# Patient Record
Sex: FEMALE | Race: WHITE | NOT HISPANIC OR LATINO
[De-identification: names, ages, dates, MRNs, and addresses within clinical notes are randomized per-mention and may not be internally consistent; named-entity substitution may affect disease eponyms.]

---

## 2017-01-25 PROBLEM — Z00.00 ENCOUNTER FOR PREVENTIVE HEALTH EXAMINATION: Status: ACTIVE | Noted: 2017-01-25

## 2017-04-18 ENCOUNTER — APPOINTMENT (OUTPATIENT)
Dept: SURGERY | Facility: CLINIC | Age: 82
End: 2017-04-18

## 2017-04-18 VITALS
HEART RATE: 74 BPM | HEIGHT: 60 IN | TEMPERATURE: 98 F | WEIGHT: 185 LBS | DIASTOLIC BLOOD PRESSURE: 70 MMHG | RESPIRATION RATE: 13 BRPM | SYSTOLIC BLOOD PRESSURE: 120 MMHG | OXYGEN SATURATION: 98 % | BODY MASS INDEX: 36.32 KG/M2

## 2017-04-18 DIAGNOSIS — Z72.3 LACK OF PHYSICAL EXERCISE: ICD-10-CM

## 2017-04-18 DIAGNOSIS — Z87.891 PERSONAL HISTORY OF NICOTINE DEPENDENCE: ICD-10-CM

## 2017-04-18 DIAGNOSIS — F15.90 OTHER STIMULANT USE, UNSPECIFIED, UNCOMPLICATED: ICD-10-CM

## 2017-04-18 DIAGNOSIS — Z91.89 OTHER SPECIFIED PERSONAL RISK FACTORS, NOT ELSEWHERE CLASSIFIED: ICD-10-CM

## 2017-04-18 RX ORDER — ROSUVASTATIN CALCIUM 5 MG/1
5 TABLET, FILM COATED ORAL
Refills: 0 | Status: ACTIVE | COMMUNITY

## 2017-09-12 ENCOUNTER — APPOINTMENT (OUTPATIENT)
Dept: SURGERY | Facility: CLINIC | Age: 82
End: 2017-09-12
Payer: MEDICARE

## 2017-09-12 DIAGNOSIS — Z82.49 FAMILY HISTORY OF ISCHEMIC HEART DISEASE AND OTHER DISEASES OF THE CIRCULATORY SYSTEM: ICD-10-CM

## 2017-09-12 DIAGNOSIS — Z83.3 FAMILY HISTORY OF DIABETES MELLITUS: ICD-10-CM

## 2017-09-12 PROCEDURE — 99214 OFFICE O/P EST MOD 30 MIN: CPT

## 2018-06-12 ENCOUNTER — APPOINTMENT (OUTPATIENT)
Dept: SURGERY | Facility: CLINIC | Age: 83
End: 2018-06-12
Payer: MEDICARE

## 2018-06-12 VITALS
SYSTOLIC BLOOD PRESSURE: 153 MMHG | DIASTOLIC BLOOD PRESSURE: 81 MMHG | WEIGHT: 180 LBS | TEMPERATURE: 98.6 F | OXYGEN SATURATION: 95 % | HEART RATE: 89 BPM | HEIGHT: 60 IN | BODY MASS INDEX: 35.34 KG/M2

## 2018-06-12 DIAGNOSIS — N60.19 DIFFUSE CYSTIC MASTOPATHY OF UNSPECIFIED BREAST: ICD-10-CM

## 2018-06-12 PROCEDURE — 99214 OFFICE O/P EST MOD 30 MIN: CPT

## 2018-10-16 ENCOUNTER — RX RENEWAL (OUTPATIENT)
Age: 83
End: 2018-10-16

## 2018-10-17 ENCOUNTER — RECORD ABSTRACTING (OUTPATIENT)
Age: 83
End: 2018-10-17

## 2018-10-17 DIAGNOSIS — E04.1 NONTOXIC SINGLE THYROID NODULE: ICD-10-CM

## 2018-10-17 DIAGNOSIS — Z83.49 FAMILY HISTORY OF OTHER ENDOCRINE, NUTRITIONAL AND METABOLIC DISEASES: ICD-10-CM

## 2018-10-19 ENCOUNTER — APPOINTMENT (OUTPATIENT)
Dept: ENDOCRINOLOGY | Facility: CLINIC | Age: 83
End: 2018-10-19
Payer: MEDICARE

## 2018-10-19 VITALS
BODY MASS INDEX: 35.34 KG/M2 | SYSTOLIC BLOOD PRESSURE: 140 MMHG | HEIGHT: 60 IN | WEIGHT: 180 LBS | DIASTOLIC BLOOD PRESSURE: 70 MMHG | HEART RATE: 86 BPM

## 2018-10-19 DIAGNOSIS — Z85.79 PERSONAL HISTORY OF OTHER MALIGNANT NEOPLASMS OF LYMPHOID, HEMATOPOIETIC AND RELATED TISSUES: ICD-10-CM

## 2018-10-19 LAB — GLUCOSE BLDC GLUCOMTR-MCNC: 153

## 2018-10-19 PROCEDURE — 82962 GLUCOSE BLOOD TEST: CPT

## 2018-10-19 PROCEDURE — 99214 OFFICE O/P EST MOD 30 MIN: CPT | Mod: 25

## 2019-04-05 ENCOUNTER — APPOINTMENT (OUTPATIENT)
Dept: ENDOCRINOLOGY | Facility: CLINIC | Age: 84
End: 2019-04-05
Payer: MEDICARE

## 2019-04-05 VITALS
HEART RATE: 84 BPM | SYSTOLIC BLOOD PRESSURE: 130 MMHG | BODY MASS INDEX: 35.14 KG/M2 | DIASTOLIC BLOOD PRESSURE: 70 MMHG | WEIGHT: 179 LBS | HEIGHT: 60 IN

## 2019-04-05 LAB
CHOLEST SERPL-MCNC: 159
GLUCOSE BLDC GLUCOMTR-MCNC: 170
HBA1C MFR BLD HPLC: 8.1
LDLC SERPL CALC-MCNC: 89
MICROALBUMIN/CREAT 24H UR-RTO: 14

## 2019-04-05 PROCEDURE — 82962 GLUCOSE BLOOD TEST: CPT

## 2019-04-05 PROCEDURE — 99214 OFFICE O/P EST MOD 30 MIN: CPT | Mod: 25

## 2019-04-05 NOTE — ASSESSMENT
[FreeTextEntry1] : 1. DM type 2, suboptimal but acceptable control\par 2. Hypothyroid, euthyroid on replacement\par 3. Hyperlipidemia, adequately controlled\par

## 2019-04-05 NOTE — PHYSICAL EXAM
[Thyroid Not Enlarged] : the thyroid was not enlarged [No Thyroid Nodules] : there were no palpable thyroid nodules [No Accessory Muscle Use] : no accessory muscle use [Clear to Auscultation] : lungs were clear to auscultation bilaterally [Normal S1, S2] : normal S1 and S2 [Regular Rhythm] : with a regular rhythm [de-identified] : left supraclavicular node

## 2019-04-05 NOTE — HISTORY OF PRESENT ILLNESS
[FreeTextEntry1] : Quality:  Type 2.   \par Severity:  Moderate\par Duration:  12 years\par Associated Symptoms:   \par Modifying Factors:   \par Notes:  Current regimen:\par 	glipizide/metformin 5/500 4/day (prefers TEVA generic brand)\par                 januvia 100\par \par transient rise in glucose following steroid injection to knee\par \par \par \par Blood Glucose Testing Information \par \par Patient is using the  meter and is testing 2 times per day.\par \par \par Past Medical History\par Notes:  lymphocytosis, small cell lymph a under observation. Has developed a left supraclavicular node and is scheduled for a PET scan\par drug rash; since then off crestor. Resumed at 5 mg 1/2 every other day without adverse effect so far.\par hypothyroid\par

## 2019-04-08 ENCOUNTER — MEDICATION RENEWAL (OUTPATIENT)
Age: 84
End: 2019-04-08

## 2019-05-17 ENCOUNTER — RX RENEWAL (OUTPATIENT)
Age: 84
End: 2019-05-17

## 2019-07-02 ENCOUNTER — APPOINTMENT (OUTPATIENT)
Dept: SURGERY | Facility: CLINIC | Age: 84
End: 2019-07-02

## 2019-07-02 ENCOUNTER — APPOINTMENT (OUTPATIENT)
Dept: BREAST CENTER | Facility: CLINIC | Age: 84
End: 2019-07-02
Payer: MEDICARE

## 2019-07-02 VITALS
SYSTOLIC BLOOD PRESSURE: 149 MMHG | WEIGHT: 178 LBS | BODY MASS INDEX: 34.95 KG/M2 | HEART RATE: 85 BPM | HEIGHT: 60 IN | DIASTOLIC BLOOD PRESSURE: 70 MMHG

## 2019-07-02 DIAGNOSIS — Z12.39 ENCOUNTER FOR OTHER SCREENING FOR MALIGNANT NEOPLASM OF BREAST: ICD-10-CM

## 2019-07-02 PROCEDURE — 99214 OFFICE O/P EST MOD 30 MIN: CPT

## 2019-07-02 NOTE — HISTORY OF PRESENT ILLNESS
[FreeTextEntry1] : I had the pleasure of seeing Mary Ford in the office for a clinical breast evaluation. Mary is a talia 80 yo postmenopausal female who is a known patient to me from [facility].\par \par She is in good general health and has fibrocystic disease of the breast. \par She denies dominant breast mass, skin changes or nipple discharge.\par \par She has a personal history with small lymphocytic lymphoma and is being managed by Dr. Gio Jimenez with surveillance (dX in 2017). She underwent a right breast excisional biopsy for a benign cyst more than 10 years ago.\par \par Imaging: [facility]\par 3/29/2019 Bilateral digital mammogram with 3D breast Tomosythesis\par Impression:  Patient has lymphadenopathy in both axilla which could be related to patients history of small lymphocytic lymphoma.  Recommend clinical correlation.  BIRADS 2 benign\par \par We have reviewed and discussed screening mammogram and results. Mary understands that she may continue annual screening mammogram as long as her health permits and she would be able to undergo necessary treatment if there was an abnormality found.  \par \par All questions were answered. \par

## 2019-07-02 NOTE — PHYSICAL EXAM
[Atraumatic] : atraumatic [Normocephalic] : normocephalic [Supple] : supple [PERRL] : pupils equal, round and reactive to light [Sclera nonicteric] : sclera nonicteric [Examined in the supine and seated position] : examined in the supine and seated position [No Supraclavicular Adenopathy] : no supraclavicular adenopathy [No dominant masses] : no dominant masses in right breast  [No dominant masses] : no dominant masses left breast [No Nipple Retraction] : no left nipple retraction [No Nipple Discharge] : no left nipple discharge [Breast Nipple Flattening] : nipples not flattened [Breast Nipple Inversion] : nipples not inverted [Breast Nipple Retraction] : nipples not retracted [Breast Abnormal Lactation (Galactorrhea)] : no galactorrhea [Breast Abnormal Secretion Serous Fluid] : no serous discharge [Breast Abnormal Secretion Bloody Fluid] : no bloody discharge [Breast Nipple Fissures] : nipples not fissured [No Axillary Lymphadenopathy] : no left axillary lymphadenopathy [Breast Abnormal Secretion Opalescent Fluid] : no milky discharge [No Edema] : no edema [No Ulceration] : no ulceration [No Rashes] : no rashes [de-identified] : No supraclavicular or axillary adenopathy. No dominant masses, normal to palpation.  Everted nipple without discharge. No skin changes.  [de-identified] : No supraclavicular or axillary adenopathy. No dominant masses, normal to palpation.  Everted nipple without discharge. No skin changes.

## 2019-07-02 NOTE — ASSESSMENT
[FreeTextEntry1] : 82 yo postmenopausal female with fibrocystic disease of the breast presents for clinical breast evaluation. There is no evidence of breast malignancy on clinical examination or review of recent imaging.\par 1. Clinical examination in 6 months to 1 year\par 2. Annual bilateral screening mammogram\par 3. Maintain vitamin d level at or above 40, walk 3x week for 25-40 minutes, decrease red meats in the diet. \par 4.  Will request consult note from Dr. Gio Jimenez\par \par

## 2019-07-11 ENCOUNTER — RX CHANGE (OUTPATIENT)
Age: 84
End: 2019-07-11

## 2019-09-16 ENCOUNTER — APPOINTMENT (OUTPATIENT)
Dept: ENDOCRINOLOGY | Facility: CLINIC | Age: 84
End: 2019-09-16
Payer: MEDICARE

## 2019-09-16 VITALS
WEIGHT: 176 LBS | SYSTOLIC BLOOD PRESSURE: 120 MMHG | HEART RATE: 73 BPM | BODY MASS INDEX: 34.55 KG/M2 | DIASTOLIC BLOOD PRESSURE: 60 MMHG | HEIGHT: 60 IN

## 2019-09-16 LAB
GLUCOSE BLDC GLUCOMTR-MCNC: 161
GLUCOSE SERPL-MCNC: 169
HBA1C MFR BLD HPLC: 7.1
LDLC SERPL DIRECT ASSAY-MCNC: 98

## 2019-09-16 PROCEDURE — 82962 GLUCOSE BLOOD TEST: CPT

## 2019-09-16 PROCEDURE — 99214 OFFICE O/P EST MOD 30 MIN: CPT | Mod: 25

## 2019-09-16 NOTE — HISTORY OF PRESENT ILLNESS
[FreeTextEntry1] : Quality:  Type 2.   \par Severity:  Moderate\par Duration:  12 years\par Associated Symptoms:   \par Modifying Factors:   \par Notes:  Current regimen:\par 	glipizide/metformin 5/500 4/day (prefers TEVA generic brand)\par                 januvia 100\par \par started on gabapentin, but developed rash and stopped this\par \par \par \par Blood Glucose Testing Information \par \par Patient is using the  meter and is testing 2 times per day.\par \par \par Past Medical History\par Notes:  lymphocytosis, small cell lymphoma under observation. Has developed a left supraclavicular node\par drug rash; since then off crestor. Resumed at 5 mg 1/2 every other day without adverse effect so far.\par hypothyroid\par

## 2019-09-16 NOTE — ASSESSMENT
[FreeTextEntry1] : 1. DM type 2, good control\par 2. Hypothyroid, euthyroid on replacement\par 3. Hyperlipidemia, adequately controlled\par 4. cellulitis vs. allergic reaction. Pt. will follow up with PCP\par

## 2019-09-16 NOTE — PHYSICAL EXAM
[Thyroid Not Enlarged] : the thyroid was not enlarged [Clear to Auscultation] : lungs were clear to auscultation bilaterally [Regular Rhythm] : with a regular rhythm [Supraclavicular Nodes] : supraclavicular nodes [de-identified] : erythematous rash LLE, with area of demarcation. Not warm

## 2020-02-10 RX ORDER — BLOOD-GLUCOSE METER
W/DEVICE KIT MISCELLANEOUS
Qty: 1 | Refills: 0 | Status: ACTIVE | COMMUNITY
Start: 2020-02-10 | End: 1900-01-01

## 2020-06-01 ENCOUNTER — RX RENEWAL (OUTPATIENT)
Age: 85
End: 2020-06-01

## 2020-08-05 ENCOUNTER — RX RENEWAL (OUTPATIENT)
Age: 85
End: 2020-08-05

## 2020-08-25 ENCOUNTER — APPOINTMENT (OUTPATIENT)
Dept: SURGERY | Facility: CLINIC | Age: 85
End: 2020-08-25

## 2020-11-25 ENCOUNTER — APPOINTMENT (OUTPATIENT)
Dept: ENDOCRINOLOGY | Facility: CLINIC | Age: 85
End: 2020-11-25
Payer: MEDICARE

## 2020-11-25 DIAGNOSIS — I10 ESSENTIAL (PRIMARY) HYPERTENSION: ICD-10-CM

## 2020-11-25 PROCEDURE — 99442: CPT

## 2020-11-25 NOTE — ASSESSMENT
[FreeTextEntry1] : 1. DM type 2, likely controlled\par 2. Hypothyroid, euthyroid on replacement\par 3. Hyperlipidemia, on therapy

## 2020-11-25 NOTE — HISTORY OF PRESENT ILLNESS
[Home] : at home, [unfilled] , at the time of the visit. [Other Location: e.g. Home (Enter Location, City,State)___] : at [unfilled] [Verbal consent obtained from patient] : the patient, [unfilled] [FreeTextEntry1] : Quality:  Type 2.   \par Severity:  Moderate\par Duration:  12 years\par Associated Symptoms:   \par Modifying Factors:   \par Notes:  Current regimen:\par 	glipizide/metformin 5/500 4/day (prefers TEVA generic brand)\par                 januvia 100\par \par started on gabapentin, but developed rash and stopped this\par \par \par \par Blood Glucose Testing Information \par \par Patient is using the  meter and is testing 2 times per day.\par \par \par Past Medical History\par Notes:  lymphocytosis, small cell lymphoma under observation. Has developed a left supraclavicular node\par drug rash; since then off crestor. Resumed at 5 mg 1/2 every other day without adverse effect so far.\par hypothyroid  on synthroid manuel\par

## 2020-12-22 ENCOUNTER — APPOINTMENT (OUTPATIENT)
Dept: ENDOCRINOLOGY | Facility: CLINIC | Age: 85
End: 2020-12-22
Payer: MEDICARE

## 2020-12-22 PROCEDURE — 99442: CPT

## 2020-12-22 NOTE — HISTORY OF PRESENT ILLNESS
[Home] : at home, [unfilled] , at the time of the visit. [Medical Office: ([facility])___] : at the medical office located in  [Verbal consent obtained from patient] : the patient, [unfilled] [FreeTextEntry1] : Quality:  Type 2.   \par Severity:  Moderate\par Duration:  12 years\par Associated Symptoms:   \par Modifying Factors:   \par Notes:  Current regimen:\par 	glipizide/metformin 5/500 4/day (prefers TEVA generic brand)\par                 januvia 100\par \par started on gabapentin, but developed rash and stopped this\par \par c/o intermittent lightheadedness provoked by position changes. NO associated dyspnea or syncope. Symptoms improved with reduction in torsemide to one daily. Has follow up scheduled for lab testing.\par \par \par \par Blood Glucose Testing Information \par \par Patient is using the  meter and is testing 2 times per day.\par \par \par Past Medical History\par Notes:  lymphocytosis, small cell lymphoma under observation. Has developed a left supraclavicular node\par drug rash; since then off crestor. Resumed at 5 mg 1/2 every other day without adverse effect so far.\par hypothyroid  on synthroid manuel\par

## 2020-12-22 NOTE — ASSESSMENT
[FreeTextEntry1] : Lightheaded symptoms likely postural, unrelated to diabetes or thyroid status. Pt. encouraged to seek follow up for symptoms. WIll review lab data when available.

## 2021-01-11 RX ORDER — GLIPIZIDE 5 MG/1
5 TABLET ORAL
Qty: 360 | Refills: 3 | Status: DISCONTINUED | COMMUNITY
Start: 2019-07-11 | End: 2021-01-11

## 2021-01-11 RX ORDER — METFORMIN HYDROCHLORIDE 500 MG/1
500 TABLET, COATED ORAL TWICE DAILY
Qty: 360 | Refills: 3 | Status: DISCONTINUED | COMMUNITY
Start: 2019-07-11 | End: 2021-01-11

## 2021-02-02 RX ORDER — ISOPROPYL ALCOHOL 0.7 ML/ML
SWAB TOPICAL
Qty: 2 | Refills: 3 | Status: ACTIVE | COMMUNITY
Start: 1900-01-01 | End: 1900-01-01

## 2021-03-31 LAB — HBA1C MFR BLD HPLC: 7.3

## 2021-04-01 ENCOUNTER — APPOINTMENT (OUTPATIENT)
Dept: ENDOCRINOLOGY | Facility: CLINIC | Age: 86
End: 2021-04-01
Payer: MEDICARE

## 2021-04-01 VITALS
HEIGHT: 60 IN | WEIGHT: 172 LBS | BODY MASS INDEX: 33.77 KG/M2 | SYSTOLIC BLOOD PRESSURE: 122 MMHG | HEART RATE: 75 BPM | DIASTOLIC BLOOD PRESSURE: 74 MMHG | OXYGEN SATURATION: 95 %

## 2021-04-01 LAB — GLUCOSE BLDC GLUCOMTR-MCNC: 120

## 2021-04-01 PROCEDURE — 99214 OFFICE O/P EST MOD 30 MIN: CPT | Mod: 25

## 2021-04-01 PROCEDURE — 82962 GLUCOSE BLOOD TEST: CPT

## 2021-04-01 NOTE — ASSESSMENT
[FreeTextEntry1] : DM type 2, acceptable though suboptimal control. Pt. not candidate for tight control due to age and risk of hypoglycemia\par Hypothyroid euthyroid on replacement\par hyperlipidemia controlled

## 2021-04-01 NOTE — HISTORY OF PRESENT ILLNESS
[FreeTextEntry1] : Quality:  Type 2.   \par Severity:  Moderate\par Duration:  13 years\par Associated Symptoms:   \par Modifying Factors:   \par Notes:  Current regimen:\par 	glipizide/metformin 5/500 4/day (prefers TEVA generic brand)\par                 januvia 100\par \par started on gabapentin, but developed rash and stopped this\par \par c/o intermittent lightheadedness provoked by position changes. NO associated dyspnea or syncope. Symptoms improved with reduction in torsemide to one daily. Has follow up scheduled for lab testing.\par \par \par \par Blood Glucose Testing Information \par \par Patient is using the  meter and is testing 2 times per day.\par \par \par Past Medical History\par Notes:  lymphocytosis, small cell lymphoma under observation. Has developed a left supraclavicular node\par drug rash; since then off crestor. Resumed at 5 mg 1/2 every other day without adverse effect so far.\par hypothyroid  on synthroid manuel\par

## 2021-08-04 LAB — HBA1C MFR BLD HPLC: 7.8

## 2021-08-05 ENCOUNTER — APPOINTMENT (OUTPATIENT)
Dept: ENDOCRINOLOGY | Facility: CLINIC | Age: 86
End: 2021-08-05
Payer: MEDICARE

## 2021-08-05 VITALS
WEIGHT: 174 LBS | HEART RATE: 90 BPM | BODY MASS INDEX: 34.16 KG/M2 | HEIGHT: 60 IN | DIASTOLIC BLOOD PRESSURE: 70 MMHG | SYSTOLIC BLOOD PRESSURE: 118 MMHG

## 2021-08-05 DIAGNOSIS — E11.65 TYPE 2 DIABETES MELLITUS WITH HYPERGLYCEMIA: ICD-10-CM

## 2021-08-05 DIAGNOSIS — E03.9 HYPOTHYROIDISM, UNSPECIFIED: ICD-10-CM

## 2021-08-05 LAB — GLUCOSE BLDC GLUCOMTR-MCNC: 130

## 2021-08-05 PROCEDURE — 82962 GLUCOSE BLOOD TEST: CPT

## 2021-08-05 PROCEDURE — 99214 OFFICE O/P EST MOD 30 MIN: CPT | Mod: 25

## 2021-08-05 NOTE — ASSESSMENT
[FreeTextEntry1] : DM type 2, acceptable though suboptimal control. Pt. not candidate for tight control due to age and risk of hypoglycemia\par Hypothyroid euthyroid on replacement\par hyperlipidemia controlled\par discussed COVID vaccination

## 2021-11-07 ENCOUNTER — RX RENEWAL (OUTPATIENT)
Age: 86
End: 2021-11-07

## 2021-12-30 ENCOUNTER — APPOINTMENT (OUTPATIENT)
Dept: ENDOCRINOLOGY | Facility: CLINIC | Age: 86
End: 2021-12-30

## 2022-01-09 ENCOUNTER — RX RENEWAL (OUTPATIENT)
Age: 87
End: 2022-01-09

## 2022-01-16 ENCOUNTER — RX RENEWAL (OUTPATIENT)
Age: 87
End: 2022-01-16

## 2022-01-18 RX ORDER — BLOOD SUGAR DIAGNOSTIC
STRIP MISCELLANEOUS
Refills: 0 | Status: DISCONTINUED | COMMUNITY
End: 2022-01-18

## 2022-01-18 RX ORDER — LANCETS 30 GAUGE
EACH MISCELLANEOUS
Refills: 0 | Status: DISCONTINUED | COMMUNITY
End: 2022-01-18

## 2022-01-30 ENCOUNTER — RX RENEWAL (OUTPATIENT)
Age: 87
End: 2022-01-30

## 2022-04-18 ENCOUNTER — RX RENEWAL (OUTPATIENT)
Age: 87
End: 2022-04-18

## 2022-04-21 LAB
HBA1C MFR BLD HPLC: 7.3
LDLC SERPL CALC-MCNC: 69

## 2022-04-22 ENCOUNTER — APPOINTMENT (OUTPATIENT)
Dept: ENDOCRINOLOGY | Facility: CLINIC | Age: 87
End: 2022-04-22
Payer: MEDICARE

## 2022-04-22 VITALS
SYSTOLIC BLOOD PRESSURE: 132 MMHG | DIASTOLIC BLOOD PRESSURE: 60 MMHG | HEART RATE: 79 BPM | WEIGHT: 171 LBS | OXYGEN SATURATION: 96 % | BODY MASS INDEX: 33.57 KG/M2 | HEIGHT: 60 IN

## 2022-04-22 DIAGNOSIS — E11.9 TYPE 2 DIABETES MELLITUS W/OUT COMPLICATIONS: ICD-10-CM

## 2022-04-22 LAB — GLUCOSE BLDC GLUCOMTR-MCNC: 128

## 2022-04-22 PROCEDURE — 82962 GLUCOSE BLOOD TEST: CPT

## 2022-04-22 PROCEDURE — 99214 OFFICE O/P EST MOD 30 MIN: CPT | Mod: 25

## 2022-04-22 RX ORDER — ACALABRUTINIB 100 MG/1
CAPSULE, GELATIN COATED ORAL
Refills: 0 | Status: ACTIVE | COMMUNITY

## 2022-04-22 NOTE — PHYSICAL EXAM
Chief Complaint   Patient presents with   • Pharyngitis       HISTORY OF PRESENT ILLNESS:   Patient is a 14-year-old male who presents with his mother with a history of sore throat cough and fever which is been present for the past 3-4 days. Patient states his fever has been low-grade in the 100° range. Patient complains of headache rating it a 6 on a 0 10 pain scale which he describes as an aching behind his eyes. He states that he developed nasal congestion yesterday. Patient denies any chest pain or shortness of breath, weakness or dizziness. No abdominal pain, nausea vomiting or diarrhea.    Allergies as of 03/15/2018   • (No Known Allergies)       Current Outpatient Prescriptions   Medication Sig Dispense Refill   • amphetamine-dextroamphetamine (ADDERALL XR) 30 MG 24 hr capsule Take 1 capsule by mouth daily. 30 capsule 0   • Multiple Vitamins-Minerals (MULTIVITAMIN PO)      • Omega-3 Fatty Acids (OMEGA 3 PO)        No current facility-administered medications for this visit.        Social History     Social History   • Marital status: Single     Spouse name: N/A   • Number of children: N/A   • Years of education: N/A     Occupational History   • Not on file.     Social History Main Topics   • Smoking status: Never Smoker   • Smokeless tobacco: Never Used   • Alcohol use Not on file   • Drug use: Unknown   • Sexual activity: Not on file     Other Topics Concern   • Not on file     Social History Narrative   • No narrative on file       Patient Active Problem List   Diagnosis   • ADD (attention deficit disorder)       Past Medical History:   Diagnosis Date   • ADD (attention deficit disorder)        REVIEW OF SYSTEMS:   All systems reviewed per Smart Chart and negative.    PHYSICAL EXAM:   Visit Vitals  /68 ([place]: [facility] [place] Sitting)   Pulse 116   Temp 100.2 °F (37.9 °C) (Oral)   Resp 20   Wt 74.3 kg     GENERAL:  Patient is a 14 year old male  who presents in NAD. Patient is well  developed, well nourished, alert and oriented x 3.  EXAM: Patient is nontoxic in appearance and appears well-hydrated.  HEENT exam: Head is normocephalic, atraumatic. Eyes: Pupils equal, round, react to light and accommodate. Conjunctivae normal in appearance. Extraocular movements are intact. Nares patent. There is no sinus tenderness to palpation or percussion. Pharynx is pink- there is no erythema or exudates noted. No anterior cervical or posterior cervical adenopathy present. No supraclavicular adenopathy appreciated. Tympanic membranes are clear bilaterally with no effusion or erythema present.  Lungs are clear to auscultation anterior and posteriorly. No rales, rhonchi, or wheezing noted.  Heart: Regular rate and rhythm, no murmur noted.   Extremities: Patient with good range of motion and strength of all 4 extremities. No clubbing, cyanosis or edema noted.  Skin: Skin is warm and dry with no lesions or rashes noted.    Summary of Urgent Care Course:  Patient had a rapid strep performed which was negative. Results were discussed with patient/mother. Throat culture will be sent.  Rapid influenza was positive for influenza B.  Patient was given 650 mg oral Tylenol with water to drink.    DIAGNOSIS:   1. Sorethroat    2. Fever, unspecified fever cause    3. Cough    4. Acute nonintractable headache, unspecified headache type    5. Influenza B        PLAN:  Rest, drink plenty of fluids.  May try Mucinex (guaifenesin) - obtain from local pharmacy and use as directed.  May also try Delsym cough syrup (dextromethorphan) - obtain from local pharmacy.  Tylenol/Ibuprofen as needed for fever/pain.  Follow up with Primary MD in the next 4-5 days if no improvement -sooner if worse.  Return or go to the ER with any worsening symptoms, problems, concerns.    Supervising physician Dr. Sonam Johnson     [Thyroid Not Enlarged] : the thyroid was not enlarged [Clear to Auscultation] : lungs were clear to auscultation bilaterally [Normal Rate] : heart rate was normal

## 2022-04-29 ENCOUNTER — RX RENEWAL (OUTPATIENT)
Age: 87
End: 2022-04-29

## 2022-06-22 NOTE — HISTORY OF PRESENT ILLNESS
[FreeTextEntry1] : Quality:  Type 2.   \par Severity:  Moderate\par Duration:  14 years\par Associated Symptoms:   \par Modifying Factors:   \par Notes:  Current regimen:\par 	glipizide/metformin 5/500 4/day (prefers TEVA generic brand)\par                 januvia 100\par \par started on gabapentin, but developed rash and stopped this\par \par c/o intermittent lightheadedness provoked by position changes. NO associated dyspnea or syncope. Symptoms improved with reduction in torsemide to one daily. Has follow up scheduled for lab testing.\par \par \par \par Blood Glucose Testing Information \par \par Patient is using the  meter and is testing 2 times per day.\par \par \par Past Medical History\par Notes:  lymphocytosis, small cell lymphoma under observation. Has developed a left supraclavicular node\par drug rash; since then off crestor. Resumed at 5 mg 1/2 every other day without adverse effect so far.\par hypothyroid  on synthroid manuel\par

## 2022-06-22 NOTE — ASSESSMENT
[FreeTextEntry1] : DM type 2, acceptable though suboptimal control. Pt. not candidate for tight control due to age and risk of hypoglycemia\par Hypothyroid euthyroid on replacement\par hyperlipidemia controlled\par \par pt. needs to continue checking her blood sugar twice a day in order to detect and treat hypoglycemic episodes

## 2022-08-03 ENCOUNTER — NON-APPOINTMENT (OUTPATIENT)
Age: 87
End: 2022-08-03

## 2022-08-12 ENCOUNTER — RESULT CHARGE (OUTPATIENT)
Age: 87
End: 2022-08-12

## 2022-08-12 ENCOUNTER — APPOINTMENT (OUTPATIENT)
Dept: ENDOCRINOLOGY | Facility: CLINIC | Age: 87
End: 2022-08-12

## 2022-08-12 VITALS
HEART RATE: 77 BPM | SYSTOLIC BLOOD PRESSURE: 130 MMHG | BODY MASS INDEX: 32.98 KG/M2 | DIASTOLIC BLOOD PRESSURE: 70 MMHG | WEIGHT: 168 LBS | HEIGHT: 60 IN

## 2022-08-12 LAB — GLUCOSE BLDC GLUCOMTR-MCNC: 164

## 2022-08-12 PROCEDURE — 82962 GLUCOSE BLOOD TEST: CPT

## 2022-08-12 PROCEDURE — 99214 OFFICE O/P EST MOD 30 MIN: CPT | Mod: 25

## 2022-08-12 NOTE — HISTORY OF PRESENT ILLNESS
[FreeTextEntry1] : Quality:  Type 2.   \par Severity:  Moderate\par Duration:  14 years\par Associated Symptoms:   \par Modifying Factors:   \par Notes:  Current regimen:\par 	glipizide/metformin 5/500 4/day (prefers TEVA generic brand) plus additional matformin 500 mg, which seems to improve                 her control\par                 januvia 100\par \par started on gabapentin, but developed rash and stopped this\par \par c/o intermittent lightheadedness provoked by position changes. NO associated dyspnea or syncope. Symptoms improved with reduction in torsemide to one daily. Has follow up scheduled for lab testing.\par \par \par \par Blood Glucose Testing Information \par \par Patient is using the  meter and is testing 2 times per day.\par \par \par Past Medical History\par Notes:  lymphocytosis, small cell lymphoma under treatment. Has developed a left supraclavicular node\par drug rash; since then off crestor. Resumed at 5 mg 1/2 every other day without adverse effect so far.\par hypothyroid  on synthroid manuel\par

## 2022-12-05 ENCOUNTER — OFFICE (OUTPATIENT)
Dept: URBAN - METROPOLITAN AREA CLINIC 103 | Facility: CLINIC | Age: 87
Setting detail: OPHTHALMOLOGY
End: 2022-12-05
Payer: MEDICARE

## 2022-12-05 DIAGNOSIS — H43.393: ICD-10-CM

## 2022-12-05 DIAGNOSIS — E11.3312: ICD-10-CM

## 2022-12-05 DIAGNOSIS — E11.3313: ICD-10-CM

## 2022-12-05 DIAGNOSIS — H35.373: ICD-10-CM

## 2022-12-05 PROCEDURE — 92134 CPTRZ OPH DX IMG PST SGM RTA: CPT | Performed by: OPHTHALMOLOGY

## 2022-12-05 PROCEDURE — 67028 INJECTION EYE DRUG: CPT | Performed by: OPHTHALMOLOGY

## 2022-12-05 ASSESSMENT — REFRACTION_AUTOREFRACTION
OD_CYLINDER: -1.00
OS_CYLINDER: -0.50
OS_SPHERE: +0.50
OD_SPHERE: -0.25
OS_AXIS: 105
OD_AXIS: 002

## 2022-12-05 ASSESSMENT — KERATOMETRY
OD_K2POWER_DIOPTERS: 43.50
OS_AXISANGLE_DEGREES: 081
OD_K1POWER_DIOPTERS: 42.50
OS_K1POWER_DIOPTERS: 42.50
OS_K2POWER_DIOPTERS: 43.00
OD_AXISANGLE_DEGREES: 121

## 2022-12-05 ASSESSMENT — PACHYMETRY
OS_CT_CORRECTION: -1
OD_CT_UM: 560
OD_CT_CORRECTION: -1
OS_CT_UM: 560

## 2022-12-05 ASSESSMENT — AXIALLENGTH_DERIVED
OS_AL: 23.7713
OD_AL: 24.0776

## 2022-12-05 ASSESSMENT — SPHEQUIV_DERIVED
OS_SPHEQUIV: 0.25
OD_SPHEQUIV: -0.75

## 2022-12-05 ASSESSMENT — VISUAL ACUITY
OD_BCVA: 20/25-1
OS_BCVA: 20/20-2

## 2022-12-05 ASSESSMENT — CONFRONTATIONAL VISUAL FIELD TEST (CVF)
OS_FINDINGS: FULL
OD_FINDINGS: FULL

## 2022-12-05 ASSESSMENT — TONOMETRY
OS_IOP_MMHG: 20
OD_IOP_MMHG: 20

## 2022-12-19 ENCOUNTER — RX RENEWAL (OUTPATIENT)
Age: 87
End: 2022-12-19

## 2022-12-21 ENCOUNTER — OFFICE (OUTPATIENT)
Dept: URBAN - METROPOLITAN AREA CLINIC 105 | Facility: CLINIC | Age: 87
Setting detail: OPHTHALMOLOGY
End: 2022-12-21
Payer: MEDICARE

## 2022-12-21 DIAGNOSIS — E11.3313: ICD-10-CM

## 2022-12-21 DIAGNOSIS — E11.3312: ICD-10-CM

## 2022-12-21 PROCEDURE — 92134 CPTRZ OPH DX IMG PST SGM RTA: CPT | Performed by: OPHTHALMOLOGY

## 2022-12-21 PROCEDURE — 67210 TREATMENT OF RETINAL LESION: CPT | Performed by: OPHTHALMOLOGY

## 2022-12-21 ASSESSMENT — SPHEQUIV_DERIVED
OS_SPHEQUIV: 0.25
OD_SPHEQUIV: -0.75

## 2022-12-21 ASSESSMENT — VISUAL ACUITY
OD_BCVA: 20/40-1
OS_BCVA: 20/25

## 2022-12-21 ASSESSMENT — REFRACTION_AUTOREFRACTION
OD_SPHERE: -0.25
OS_SPHERE: +0.50
OS_AXIS: 105
OD_AXIS: 002
OS_CYLINDER: -0.50
OD_CYLINDER: -1.00

## 2022-12-21 ASSESSMENT — KERATOMETRY
OD_AXISANGLE_DEGREES: 121
OD_K2POWER_DIOPTERS: 43.50
OS_K1POWER_DIOPTERS: 42.50
OS_K2POWER_DIOPTERS: 43.00
OD_K1POWER_DIOPTERS: 42.50
OS_AXISANGLE_DEGREES: 081

## 2022-12-21 ASSESSMENT — AXIALLENGTH_DERIVED
OS_AL: 23.7713
OD_AL: 24.0776

## 2022-12-21 ASSESSMENT — CONFRONTATIONAL VISUAL FIELD TEST (CVF)
OD_FINDINGS: FULL
OS_FINDINGS: FULL

## 2022-12-26 ENCOUNTER — NON-APPOINTMENT (OUTPATIENT)
Age: 87
End: 2022-12-26

## 2022-12-30 ENCOUNTER — APPOINTMENT (OUTPATIENT)
Dept: ENDOCRINOLOGY | Facility: CLINIC | Age: 87
End: 2022-12-30

## 2023-01-09 ENCOUNTER — APPOINTMENT (OUTPATIENT)
Dept: ENDOCRINOLOGY | Facility: CLINIC | Age: 88
End: 2023-01-09
Payer: MEDICARE

## 2023-01-09 ENCOUNTER — RESULT CHARGE (OUTPATIENT)
Age: 88
End: 2023-01-09

## 2023-01-09 VITALS
OXYGEN SATURATION: 96 % | HEIGHT: 60 IN | HEART RATE: 71 BPM | BODY MASS INDEX: 33.38 KG/M2 | DIASTOLIC BLOOD PRESSURE: 70 MMHG | SYSTOLIC BLOOD PRESSURE: 135 MMHG | WEIGHT: 170 LBS

## 2023-01-09 LAB — GLUCOSE BLDC GLUCOMTR-MCNC: 85

## 2023-01-09 PROCEDURE — 99214 OFFICE O/P EST MOD 30 MIN: CPT | Mod: 25

## 2023-01-09 PROCEDURE — 82962 GLUCOSE BLOOD TEST: CPT

## 2023-01-09 RX ORDER — HYDROCHLOROTHIAZIDE 25 MG/1
25 TABLET ORAL
Refills: 0 | Status: ACTIVE | COMMUNITY

## 2023-01-09 RX ORDER — TORSEMIDE 20 MG/1
20 TABLET ORAL
Refills: 0 | Status: DISCONTINUED | COMMUNITY
End: 2023-01-09

## 2023-01-09 RX ORDER — AMLODIPINE BESYLATE 10 MG/1
10 TABLET ORAL
Refills: 0 | Status: DISCONTINUED | COMMUNITY
End: 2023-01-09

## 2023-01-09 RX ORDER — POTASSIUM CHLORIDE 750 MG/1
10 TABLET, FILM COATED, EXTENDED RELEASE ORAL
Refills: 0 | Status: DISCONTINUED | COMMUNITY
End: 2023-01-09

## 2023-01-09 NOTE — ASSESSMENT
[FreeTextEntry1] : DM type 2, acceptable though suboptimal control. Pt. not candidate for tight control due to age and risk of hypoglycemia\par Hypothyroid clinically euthyroid on replacement\par \par pt. needs to continue checking her blood sugar twice a day in order to detect and treat hypoglycemic episodes

## 2023-01-24 ENCOUNTER — RX RENEWAL (OUTPATIENT)
Age: 88
End: 2023-01-24

## 2023-02-06 ENCOUNTER — OFFICE (OUTPATIENT)
Dept: URBAN - METROPOLITAN AREA CLINIC 103 | Facility: CLINIC | Age: 88
Setting detail: OPHTHALMOLOGY
End: 2023-02-06
Payer: MEDICARE

## 2023-02-06 DIAGNOSIS — H43.393: ICD-10-CM

## 2023-02-06 DIAGNOSIS — E11.3313: ICD-10-CM

## 2023-02-06 DIAGNOSIS — H35.373: ICD-10-CM

## 2023-02-06 DIAGNOSIS — E11.3311: ICD-10-CM

## 2023-02-06 PROCEDURE — 92134 CPTRZ OPH DX IMG PST SGM RTA: CPT | Performed by: OPHTHALMOLOGY

## 2023-02-06 PROCEDURE — 67210 TREATMENT OF RETINAL LESION: CPT | Performed by: OPHTHALMOLOGY

## 2023-02-06 ASSESSMENT — PACHYMETRY
OS_CT_CORRECTION: -1
OD_CT_UM: 560
OS_CT_UM: 560
OD_CT_CORRECTION: -1

## 2023-02-06 ASSESSMENT — KERATOMETRY
OS_K1POWER_DIOPTERS: 42.50
OD_K1POWER_DIOPTERS: 42.50
OS_AXISANGLE_DEGREES: 081
OS_K2POWER_DIOPTERS: 43.00
OD_AXISANGLE_DEGREES: 121
OD_K2POWER_DIOPTERS: 43.50

## 2023-02-06 ASSESSMENT — SPHEQUIV_DERIVED
OS_SPHEQUIV: 0.25
OD_SPHEQUIV: -0.75

## 2023-02-06 ASSESSMENT — REFRACTION_AUTOREFRACTION
OD_AXIS: 002
OD_CYLINDER: -1.00
OS_SPHERE: +0.50
OD_SPHERE: -0.25
OS_AXIS: 105
OS_CYLINDER: -0.50

## 2023-02-06 ASSESSMENT — CONFRONTATIONAL VISUAL FIELD TEST (CVF)
OS_FINDINGS: FULL
OD_FINDINGS: FULL

## 2023-02-06 ASSESSMENT — VISUAL ACUITY
OD_BCVA: 20/25-1
OS_BCVA: 20/20

## 2023-02-06 ASSESSMENT — AXIALLENGTH_DERIVED
OS_AL: 23.7713
OD_AL: 24.0776

## 2023-02-06 ASSESSMENT — TONOMETRY
OD_IOP_MMHG: 19
OS_IOP_MMHG: 21

## 2023-02-09 ENCOUNTER — OFFICE (OUTPATIENT)
Dept: URBAN - METROPOLITAN AREA CLINIC 105 | Facility: CLINIC | Age: 88
Setting detail: OPHTHALMOLOGY
End: 2023-02-09

## 2023-02-09 DIAGNOSIS — H90.3: ICD-10-CM

## 2023-02-09 PROCEDURE — N/C NO CHARGE: Performed by: AUDIOLOGIST-HEARING AID FITTER

## 2023-02-15 ENCOUNTER — OFFICE (OUTPATIENT)
Dept: URBAN - METROPOLITAN AREA CLINIC 105 | Facility: CLINIC | Age: 88
Setting detail: OPHTHALMOLOGY
End: 2023-02-15
Payer: MEDICARE

## 2023-02-15 DIAGNOSIS — H35.373: ICD-10-CM

## 2023-02-15 DIAGNOSIS — E11.3313: ICD-10-CM

## 2023-02-15 DIAGNOSIS — H43.393: ICD-10-CM

## 2023-02-15 DIAGNOSIS — E11.3312: ICD-10-CM

## 2023-02-15 PROCEDURE — 67028 INJECTION EYE DRUG: CPT | Performed by: OPHTHALMOLOGY

## 2023-02-15 PROCEDURE — 92134 CPTRZ OPH DX IMG PST SGM RTA: CPT | Performed by: OPHTHALMOLOGY

## 2023-02-15 ASSESSMENT — VISUAL ACUITY
OS_BCVA: 20/20-1
OD_BCVA: 20/40-1

## 2023-02-15 ASSESSMENT — KERATOMETRY
OS_K1POWER_DIOPTERS: 42.50
OD_AXISANGLE_DEGREES: 121
OS_K2POWER_DIOPTERS: 43.00
OD_K2POWER_DIOPTERS: 43.50
OD_K1POWER_DIOPTERS: 42.50
OS_AXISANGLE_DEGREES: 081

## 2023-02-15 ASSESSMENT — REFRACTION_AUTOREFRACTION
OS_SPHERE: +0.50
OD_SPHERE: -0.25
OD_CYLINDER: -1.00
OS_AXIS: 105
OD_AXIS: 002
OS_CYLINDER: -0.50

## 2023-02-15 ASSESSMENT — CONFRONTATIONAL VISUAL FIELD TEST (CVF)
OD_FINDINGS: FULL
OS_FINDINGS: FULL

## 2023-02-15 ASSESSMENT — AXIALLENGTH_DERIVED
OS_AL: 23.7713
OD_AL: 24.0776

## 2023-02-15 ASSESSMENT — SPHEQUIV_DERIVED
OD_SPHEQUIV: -0.75
OS_SPHEQUIV: 0.25

## 2023-03-06 RX ORDER — BLOOD-GLUCOSE METER
W/DEVICE EACH MISCELLANEOUS
Qty: 1 | Refills: 0 | Status: ACTIVE | COMMUNITY
Start: 2020-08-05 | End: 1900-01-01

## 2023-03-14 ENCOUNTER — OFFICE (OUTPATIENT)
Dept: URBAN - METROPOLITAN AREA CLINIC 105 | Facility: CLINIC | Age: 88
Setting detail: OPHTHALMOLOGY
End: 2023-03-14

## 2023-03-14 DIAGNOSIS — H90.3: ICD-10-CM

## 2023-03-14 PROCEDURE — HAD HEARING AID DISPENSE: Performed by: AUDIOLOGIST-HEARING AID FITTER

## 2023-03-15 RX ORDER — LANCING DEVICE/LANCETS
KIT MISCELLANEOUS
Qty: 1 | Refills: 0 | Status: ACTIVE | COMMUNITY
Start: 1900-01-01 | End: 1900-01-01

## 2023-03-27 ENCOUNTER — OFFICE (OUTPATIENT)
Dept: URBAN - METROPOLITAN AREA CLINIC 105 | Facility: CLINIC | Age: 88
Setting detail: OPHTHALMOLOGY
End: 2023-03-27
Payer: MEDICARE

## 2023-03-27 DIAGNOSIS — E11.3313: ICD-10-CM

## 2023-03-27 DIAGNOSIS — H43.393: ICD-10-CM

## 2023-03-27 DIAGNOSIS — H35.373: ICD-10-CM

## 2023-03-27 DIAGNOSIS — E11.3312: ICD-10-CM

## 2023-03-27 PROCEDURE — 92134 CPTRZ OPH DX IMG PST SGM RTA: CPT | Performed by: OPHTHALMOLOGY

## 2023-03-27 PROCEDURE — 67028 INJECTION EYE DRUG: CPT | Performed by: OPHTHALMOLOGY

## 2023-03-27 PROCEDURE — 99024 POSTOP FOLLOW-UP VISIT: CPT | Performed by: OPHTHALMOLOGY

## 2023-03-27 ASSESSMENT — REFRACTION_AUTOREFRACTION
OS_CYLINDER: -0.50
OD_AXIS: 002
OS_AXIS: 105
OS_SPHERE: +0.50
OD_CYLINDER: -1.00
OD_SPHERE: -0.25

## 2023-03-27 ASSESSMENT — KERATOMETRY
OD_K2POWER_DIOPTERS: 43.50
OD_AXISANGLE_DEGREES: 121
OS_AXISANGLE_DEGREES: 081
OS_K1POWER_DIOPTERS: 42.50
OS_K2POWER_DIOPTERS: 43.00
OD_K1POWER_DIOPTERS: 42.50

## 2023-03-27 ASSESSMENT — AXIALLENGTH_DERIVED
OD_AL: 24.0776
OS_AL: 23.7713

## 2023-03-27 ASSESSMENT — VISUAL ACUITY
OD_BCVA: 20/40-1
OS_BCVA: 20/20-1

## 2023-03-27 ASSESSMENT — SPHEQUIV_DERIVED
OS_SPHEQUIV: 0.25
OD_SPHEQUIV: -0.75

## 2023-03-27 ASSESSMENT — CONFRONTATIONAL VISUAL FIELD TEST (CVF)
OS_FINDINGS: FULL
OD_FINDINGS: FULL

## 2023-03-30 ENCOUNTER — OFFICE (OUTPATIENT)
Dept: URBAN - METROPOLITAN AREA CLINIC 105 | Facility: CLINIC | Age: 88
Setting detail: OPHTHALMOLOGY
End: 2023-03-30

## 2023-03-30 DIAGNOSIS — H90.3: ICD-10-CM

## 2023-03-30 PROCEDURE — 92593 HEARING AID CHECK; BINAURAL: CPT | Performed by: AUDIOLOGIST-HEARING AID FITTER

## 2023-04-24 ENCOUNTER — RX RENEWAL (OUTPATIENT)
Age: 88
End: 2023-04-24

## 2023-04-24 RX ORDER — LANCETS 33 GAUGE
EACH MISCELLANEOUS
Qty: 200 | Refills: 3 | Status: ACTIVE | COMMUNITY
Start: 2022-01-09 | End: 1900-01-01

## 2023-04-25 ENCOUNTER — OFFICE (OUTPATIENT)
Dept: URBAN - METROPOLITAN AREA CLINIC 105 | Facility: CLINIC | Age: 88
Setting detail: OPHTHALMOLOGY
End: 2023-04-25

## 2023-04-25 DIAGNOSIS — H90.3: ICD-10-CM

## 2023-04-25 PROCEDURE — 92593 HEARING AID CHECK; BINAURAL: CPT | Performed by: AUDIOLOGIST-HEARING AID FITTER

## 2023-05-08 ENCOUNTER — OFFICE (OUTPATIENT)
Dept: URBAN - METROPOLITAN AREA CLINIC 105 | Facility: CLINIC | Age: 88
Setting detail: OPHTHALMOLOGY
End: 2023-05-08
Payer: MEDICARE

## 2023-05-08 DIAGNOSIS — E11.3312: ICD-10-CM

## 2023-05-08 DIAGNOSIS — H43.393: ICD-10-CM

## 2023-05-08 DIAGNOSIS — H35.373: ICD-10-CM

## 2023-05-08 DIAGNOSIS — E11.3313: ICD-10-CM

## 2023-05-08 DIAGNOSIS — E11.3311: ICD-10-CM

## 2023-05-08 PROCEDURE — 67028 INJECTION EYE DRUG: CPT | Performed by: OPHTHALMOLOGY

## 2023-05-08 PROCEDURE — 92134 CPTRZ OPH DX IMG PST SGM RTA: CPT | Performed by: OPHTHALMOLOGY

## 2023-05-08 ASSESSMENT — CONFRONTATIONAL VISUAL FIELD TEST (CVF)
OS_FINDINGS: FULL
OD_FINDINGS: FULL

## 2023-05-15 ASSESSMENT — VISUAL ACUITY
OS_BCVA: 20/20
OD_BCVA: 20/30

## 2023-05-15 ASSESSMENT — REFRACTION_AUTOREFRACTION
OD_SPHERE: -0.25
OS_CYLINDER: -0.50
OS_AXIS: 105
OD_AXIS: 002
OS_SPHERE: +0.50
OD_CYLINDER: -1.00

## 2023-05-15 ASSESSMENT — KERATOMETRY
OD_K1POWER_DIOPTERS: 42.50
OS_K2POWER_DIOPTERS: 43.00
OD_K2POWER_DIOPTERS: 43.50
OS_AXISANGLE_DEGREES: 081
OD_AXISANGLE_DEGREES: 121
OS_K1POWER_DIOPTERS: 42.50

## 2023-05-15 ASSESSMENT — SPHEQUIV_DERIVED
OD_SPHEQUIV: -0.75
OS_SPHEQUIV: 0.25

## 2023-05-15 ASSESSMENT — AXIALLENGTH_DERIVED
OD_AL: 24.0776
OS_AL: 23.7713

## 2023-05-23 ENCOUNTER — OFFICE (OUTPATIENT)
Dept: URBAN - METROPOLITAN AREA CLINIC 105 | Facility: CLINIC | Age: 88
Setting detail: OPHTHALMOLOGY
End: 2023-05-23

## 2023-05-23 DIAGNOSIS — H90.3: ICD-10-CM

## 2023-05-23 PROCEDURE — 92593 HEARING AID CHECK; BINAURAL: CPT | Performed by: AUDIOLOGIST-HEARING AID FITTER

## 2023-06-29 ENCOUNTER — APPOINTMENT (OUTPATIENT)
Dept: ENDOCRINOLOGY | Facility: CLINIC | Age: 88
End: 2023-06-29
Payer: MEDICARE

## 2023-06-29 ENCOUNTER — RESULT CHARGE (OUTPATIENT)
Age: 88
End: 2023-06-29

## 2023-06-29 VITALS
WEIGHT: 170 LBS | DIASTOLIC BLOOD PRESSURE: 74 MMHG | OXYGEN SATURATION: 98 % | SYSTOLIC BLOOD PRESSURE: 142 MMHG | BODY MASS INDEX: 33.38 KG/M2 | HEART RATE: 68 BPM | HEIGHT: 60 IN

## 2023-06-29 DIAGNOSIS — E78.2 MIXED HYPERLIPIDEMIA: ICD-10-CM

## 2023-06-29 LAB — GLUCOSE BLDC GLUCOMTR-MCNC: 145

## 2023-06-29 PROCEDURE — 82962 GLUCOSE BLOOD TEST: CPT

## 2023-06-29 PROCEDURE — 99214 OFFICE O/P EST MOD 30 MIN: CPT

## 2023-06-29 NOTE — ASSESSMENT
[FreeTextEntry1] : DM type 2, acceptable though suboptimal control. Pt. not candidate for tight control due to age and risk of hypoglycemia\par Hypothyroid  euthyroid on replacement\par \par pt. needs to continue checking her blood sugar twice a day in order to detect and treat hypoglycemic episodes

## 2023-06-29 NOTE — HISTORY OF PRESENT ILLNESS
[FreeTextEntry1] : Quality:  Type 2.   \par Severity:  Moderate\par Duration:  14 years\par Associated Symptoms:   \par Modifying Factors:   \par Notes:  Current regimen:\par 	glipizide/metformin 5/500 4/day (prefers TEVA generic brand) plus additional metformin 500 mg, which seems to improve                 her control\par                 januvia 100\par \par started on gabapentin, but developed rash and stopped this\par \par c/o intermittent lightheadedness provoked by position changes. NO associated dyspnea or syncope. Symptoms improved with reduction in torsemide to one daily. Has follow up scheduled for lab testing.\par \par \par \par Blood Glucose Testing Information \par \par Patient is using the  meter and is testing 2 times per day.\par \par \par Past Medical History\par Notes:  lymphocytosis, small cell lymphoma under treatment. Has developed a left supraclavicular node\par drug rash; since then off crestor. Resumed at 5 mg 1/2 every  day without adverse effect so far.\par hypothyroid  on synthroid manuel\par

## 2023-07-10 ENCOUNTER — OFFICE (OUTPATIENT)
Dept: URBAN - METROPOLITAN AREA CLINIC 105 | Facility: CLINIC | Age: 88
Setting detail: OPHTHALMOLOGY
End: 2023-07-10
Payer: MEDICARE

## 2023-07-10 DIAGNOSIS — E11.3312: ICD-10-CM

## 2023-07-10 DIAGNOSIS — H43.393: ICD-10-CM

## 2023-07-10 DIAGNOSIS — E11.3313: ICD-10-CM

## 2023-07-10 DIAGNOSIS — H35.373: ICD-10-CM

## 2023-07-10 PROCEDURE — 92012 INTRM OPH EXAM EST PATIENT: CPT | Performed by: OPHTHALMOLOGY

## 2023-07-10 PROCEDURE — 92134 CPTRZ OPH DX IMG PST SGM RTA: CPT | Performed by: OPHTHALMOLOGY

## 2023-07-10 PROCEDURE — 67210 TREATMENT OF RETINAL LESION: CPT | Performed by: OPHTHALMOLOGY

## 2023-07-10 ASSESSMENT — AXIALLENGTH_DERIVED
OD_AL: 24.0776
OS_AL: 23.7713

## 2023-07-10 ASSESSMENT — CONFRONTATIONAL VISUAL FIELD TEST (CVF)
OD_FINDINGS: FULL
OS_FINDINGS: FULL

## 2023-07-10 ASSESSMENT — PACHYMETRY
OD_CT_CORRECTION: -1
OS_CT_UM: 560
OS_CT_CORRECTION: -1
OD_CT_UM: 560

## 2023-07-10 ASSESSMENT — SPHEQUIV_DERIVED
OS_SPHEQUIV: 0.25
OD_SPHEQUIV: -0.75

## 2023-07-10 ASSESSMENT — KERATOMETRY
OD_AXISANGLE_DEGREES: 121
OS_K1POWER_DIOPTERS: 42.50
OS_K2POWER_DIOPTERS: 43.00
OD_K2POWER_DIOPTERS: 43.50
OS_AXISANGLE_DEGREES: 081
OD_K1POWER_DIOPTERS: 42.50

## 2023-07-10 ASSESSMENT — VISUAL ACUITY
OD_BCVA: 20/30
OS_BCVA: 20/20-2

## 2023-07-10 ASSESSMENT — REFRACTION_AUTOREFRACTION
OD_CYLINDER: -1.00
OS_AXIS: 105
OD_AXIS: 002
OD_SPHERE: -0.25
OS_SPHERE: +0.50
OS_CYLINDER: -0.50

## 2023-07-10 ASSESSMENT — TONOMETRY
OS_IOP_MMHG: 18
OD_IOP_MMHG: 18

## 2023-07-18 ENCOUNTER — RX RENEWAL (OUTPATIENT)
Age: 88
End: 2023-07-18

## 2023-07-18 RX ORDER — SITAGLIPTIN 100 MG/1
100 TABLET, FILM COATED ORAL DAILY
Qty: 90 | Refills: 2 | Status: ACTIVE | COMMUNITY
Start: 2021-11-07 | End: 1900-01-01

## 2023-07-24 ENCOUNTER — OFFICE (OUTPATIENT)
Dept: URBAN - METROPOLITAN AREA CLINIC 105 | Facility: CLINIC | Age: 88
Setting detail: OPHTHALMOLOGY
End: 2023-07-24
Payer: MEDICARE

## 2023-07-24 DIAGNOSIS — E11.3311: ICD-10-CM

## 2023-07-24 PROCEDURE — 67210 TREATMENT OF RETINAL LESION: CPT | Performed by: OPHTHALMOLOGY

## 2023-07-24 ASSESSMENT — TONOMETRY
OS_IOP_MMHG: 21
OD_IOP_MMHG: 17

## 2023-07-24 ASSESSMENT — VISUAL ACUITY
OS_BCVA: 20/30+1
OD_BCVA: 20/40+1

## 2023-07-24 ASSESSMENT — KERATOMETRY
OS_K2POWER_DIOPTERS: 43.00
OD_AXISANGLE_DEGREES: 121
OS_AXISANGLE_DEGREES: 081
OD_K2POWER_DIOPTERS: 43.50
OD_K1POWER_DIOPTERS: 42.50
OS_K1POWER_DIOPTERS: 42.50

## 2023-07-24 ASSESSMENT — REFRACTION_AUTOREFRACTION
OS_AXIS: 105
OD_SPHERE: -0.25
OD_AXIS: 002
OD_CYLINDER: -1.00
OS_SPHERE: +0.50
OS_CYLINDER: -0.50

## 2023-07-24 ASSESSMENT — SPHEQUIV_DERIVED
OD_SPHEQUIV: -0.75
OS_SPHEQUIV: 0.25

## 2023-07-24 ASSESSMENT — AXIALLENGTH_DERIVED
OD_AL: 24.0776
OS_AL: 23.7713

## 2023-07-24 ASSESSMENT — PACHYMETRY
OS_CT_CORRECTION: -1
OD_CT_CORRECTION: -1
OS_CT_UM: 560
OD_CT_UM: 560

## 2023-07-24 ASSESSMENT — CONFRONTATIONAL VISUAL FIELD TEST (CVF)
OD_FINDINGS: FULL
OS_FINDINGS: FULL

## 2023-08-14 ENCOUNTER — RX RENEWAL (OUTPATIENT)
Age: 88
End: 2023-08-14

## 2023-08-14 ENCOUNTER — OFFICE (OUTPATIENT)
Dept: URBAN - METROPOLITAN AREA CLINIC 105 | Facility: CLINIC | Age: 88
Setting detail: OPHTHALMOLOGY
End: 2023-08-14
Payer: MEDICARE

## 2023-08-14 DIAGNOSIS — E11.3313: ICD-10-CM

## 2023-08-14 DIAGNOSIS — H35.373: ICD-10-CM

## 2023-08-14 PROCEDURE — 67028 INJECTION EYE DRUG: CPT | Performed by: OPHTHALMOLOGY

## 2023-08-14 PROCEDURE — 92134 CPTRZ OPH DX IMG PST SGM RTA: CPT | Performed by: OPHTHALMOLOGY

## 2023-08-14 RX ORDER — LANCING DEVICE/LANCETS
KIT MISCELLANEOUS
Qty: 1 | Refills: 0 | Status: ACTIVE | COMMUNITY
Start: 2023-08-14 | End: 1900-01-01

## 2023-08-14 ASSESSMENT — PACHYMETRY
OS_CT_UM: 560
OD_CT_UM: 560
OS_CT_CORRECTION: -1
OD_CT_CORRECTION: -1

## 2023-08-14 ASSESSMENT — KERATOMETRY
OS_K1POWER_DIOPTERS: 42.50
OD_K1POWER_DIOPTERS: 42.50
OS_AXISANGLE_DEGREES: 081
OD_AXISANGLE_DEGREES: 121
OS_K2POWER_DIOPTERS: 43.00
OD_K2POWER_DIOPTERS: 43.50

## 2023-08-14 ASSESSMENT — REFRACTION_AUTOREFRACTION
OD_AXIS: 002
OD_CYLINDER: -1.00
OS_AXIS: 105
OS_CYLINDER: -0.50
OD_SPHERE: -0.25
OS_SPHERE: +0.50

## 2023-08-14 ASSESSMENT — CONFRONTATIONAL VISUAL FIELD TEST (CVF)
OD_FINDINGS: FULL
OS_FINDINGS: FULL

## 2023-08-14 ASSESSMENT — SPHEQUIV_DERIVED
OS_SPHEQUIV: 0.25
OD_SPHEQUIV: -0.75

## 2023-08-14 ASSESSMENT — TONOMETRY
OS_IOP_MMHG: 21
OD_IOP_MMHG: 19

## 2023-08-14 ASSESSMENT — VISUAL ACUITY
OD_BCVA: 20/30-
OS_BCVA: 20/30-2

## 2023-08-14 ASSESSMENT — AXIALLENGTH_DERIVED
OD_AL: 24.0776
OS_AL: 23.7713

## 2023-09-14 ENCOUNTER — OFFICE (OUTPATIENT)
Dept: URBAN - METROPOLITAN AREA CLINIC 12 | Facility: CLINIC | Age: 88
Setting detail: OPHTHALMOLOGY
End: 2023-09-14

## 2023-09-14 DIAGNOSIS — H90.3: ICD-10-CM

## 2023-09-14 PROCEDURE — 92593 HEARING AID CHECK; BINAURAL: CPT | Performed by: AUDIOLOGIST-HEARING AID FITTER

## 2023-10-23 ENCOUNTER — OFFICE (OUTPATIENT)
Dept: URBAN - METROPOLITAN AREA CLINIC 105 | Facility: CLINIC | Age: 88
Setting detail: OPHTHALMOLOGY
End: 2023-10-23
Payer: MEDICARE

## 2023-10-23 DIAGNOSIS — H35.373: ICD-10-CM

## 2023-10-23 DIAGNOSIS — E11.3313: ICD-10-CM

## 2023-10-23 PROCEDURE — 67028 INJECTION EYE DRUG: CPT | Mod: 50 | Performed by: OPHTHALMOLOGY

## 2023-10-23 PROCEDURE — 92134 CPTRZ OPH DX IMG PST SGM RTA: CPT | Performed by: OPHTHALMOLOGY

## 2023-10-23 ASSESSMENT — REFRACTION_AUTOREFRACTION
OD_AXIS: 002
OS_CYLINDER: -0.50
OS_AXIS: 105
OS_SPHERE: +0.50
OD_CYLINDER: -1.00
OD_SPHERE: -0.25

## 2023-10-23 ASSESSMENT — KERATOMETRY
OS_AXISANGLE_DEGREES: 081
OD_AXISANGLE_DEGREES: 121
OD_K2POWER_DIOPTERS: 43.50
OD_K1POWER_DIOPTERS: 42.50
OS_K2POWER_DIOPTERS: 43.00
OS_K1POWER_DIOPTERS: 42.50

## 2023-10-23 ASSESSMENT — CONFRONTATIONAL VISUAL FIELD TEST (CVF)
OD_FINDINGS: FULL
OS_FINDINGS: FULL

## 2023-10-23 ASSESSMENT — AXIALLENGTH_DERIVED
OS_AL: 23.7713
OD_AL: 24.0776

## 2023-10-23 ASSESSMENT — VISUAL ACUITY
OD_BCVA: 20/30-2
OS_BCVA: 20/20

## 2023-10-23 ASSESSMENT — PACHYMETRY
OD_CT_UM: 560
OS_CT_UM: 560
OD_CT_CORRECTION: -1
OS_CT_CORRECTION: -1

## 2023-10-23 ASSESSMENT — TONOMETRY
OS_IOP_MMHG: 21
OD_IOP_MMHG: 18

## 2023-10-23 ASSESSMENT — SPHEQUIV_DERIVED
OD_SPHEQUIV: -0.75
OS_SPHEQUIV: 0.25

## 2023-11-13 ENCOUNTER — OFFICE (OUTPATIENT)
Dept: URBAN - METROPOLITAN AREA CLINIC 105 | Facility: CLINIC | Age: 88
Setting detail: OPHTHALMOLOGY
End: 2023-11-13
Payer: MEDICARE

## 2023-11-13 DIAGNOSIS — H35.373: ICD-10-CM

## 2023-11-13 DIAGNOSIS — E11.3313: ICD-10-CM

## 2023-11-13 DIAGNOSIS — E11.3312: ICD-10-CM

## 2023-11-13 DIAGNOSIS — H43.393: ICD-10-CM

## 2023-11-13 PROCEDURE — 67210 TREATMENT OF RETINAL LESION: CPT | Mod: LT | Performed by: OPHTHALMOLOGY

## 2023-11-13 PROCEDURE — 92134 CPTRZ OPH DX IMG PST SGM RTA: CPT | Performed by: OPHTHALMOLOGY

## 2023-11-13 ASSESSMENT — SPHEQUIV_DERIVED
OD_SPHEQUIV: -0.75
OS_SPHEQUIV: 0.25

## 2023-11-13 ASSESSMENT — REFRACTION_AUTOREFRACTION
OS_CYLINDER: -0.50
OD_CYLINDER: -1.00
OS_AXIS: 105
OD_AXIS: 002
OD_SPHERE: -0.25
OS_SPHERE: +0.50

## 2023-11-13 ASSESSMENT — CONFRONTATIONAL VISUAL FIELD TEST (CVF)
OD_FINDINGS: FULL
OS_FINDINGS: FULL

## 2023-11-27 ENCOUNTER — OFFICE (OUTPATIENT)
Dept: URBAN - METROPOLITAN AREA CLINIC 105 | Facility: CLINIC | Age: 88
Setting detail: OPHTHALMOLOGY
End: 2023-11-27
Payer: MEDICARE

## 2023-11-27 DIAGNOSIS — E11.3311: ICD-10-CM

## 2023-11-27 PROCEDURE — 67210 TREATMENT OF RETINAL LESION: CPT | Mod: 79,RT | Performed by: OPHTHALMOLOGY

## 2023-11-27 RX ORDER — BLOOD SUGAR DIAGNOSTIC
STRIP MISCELLANEOUS
Qty: 3 | Refills: 1 | Status: ACTIVE | COMMUNITY
Start: 2019-05-17 | End: 1900-01-01

## 2023-11-27 ASSESSMENT — REFRACTION_AUTOREFRACTION
OS_CYLINDER: -0.50
OS_AXIS: 105
OS_SPHERE: +0.50
OD_SPHERE: -0.25
OD_AXIS: 002
OD_CYLINDER: -1.00

## 2023-11-27 ASSESSMENT — CONFRONTATIONAL VISUAL FIELD TEST (CVF)
OS_FINDINGS: FULL
OD_FINDINGS: FULL

## 2023-11-27 ASSESSMENT — SPHEQUIV_DERIVED
OD_SPHEQUIV: -0.75
OS_SPHEQUIV: 0.25

## 2024-01-11 ENCOUNTER — NON-APPOINTMENT (OUTPATIENT)
Age: 89
End: 2024-01-11

## 2024-01-11 ENCOUNTER — APPOINTMENT (OUTPATIENT)
Dept: ENDOCRINOLOGY | Facility: CLINIC | Age: 89
End: 2024-01-11

## 2024-01-16 RX ORDER — LEVOTHYROXINE SODIUM 112 UG/1
112 TABLET ORAL DAILY
Qty: 90 | Refills: 2 | Status: ACTIVE | COMMUNITY
Start: 1900-01-01 | End: 1900-01-01

## 2024-01-23 ENCOUNTER — OFFICE (OUTPATIENT)
Dept: URBAN - METROPOLITAN AREA CLINIC 105 | Facility: CLINIC | Age: 89
Setting detail: OPHTHALMOLOGY
End: 2024-01-23

## 2024-01-23 DIAGNOSIS — H90.3: ICD-10-CM

## 2024-01-23 PROCEDURE — 92593 HEARING AID CHECK; BINAURAL: CPT | Performed by: AUDIOLOGIST-HEARING AID FITTER

## 2024-01-25 LAB
HBA1C MFR BLD HPLC: 8.3
LDLC SERPL DIRECT ASSAY-MCNC: 70
TSH SERPL-ACNC: 4.19

## 2024-01-26 ENCOUNTER — TRANSCRIPTION ENCOUNTER (OUTPATIENT)
Age: 89
End: 2024-01-26

## 2024-01-26 ENCOUNTER — APPOINTMENT (OUTPATIENT)
Dept: ENDOCRINOLOGY | Facility: CLINIC | Age: 89
End: 2024-01-26
Payer: MEDICARE

## 2024-01-26 VITALS
SYSTOLIC BLOOD PRESSURE: 120 MMHG | HEART RATE: 76 BPM | WEIGHT: 164 LBS | HEIGHT: 60 IN | OXYGEN SATURATION: 96 % | BODY MASS INDEX: 32.2 KG/M2 | DIASTOLIC BLOOD PRESSURE: 70 MMHG

## 2024-01-26 DIAGNOSIS — E11.65 TYPE 2 DIABETES MELLITUS WITH HYPERGLYCEMIA: ICD-10-CM

## 2024-01-26 DIAGNOSIS — E03.9 HYPOTHYROIDISM, UNSPECIFIED: ICD-10-CM

## 2024-01-26 LAB — GLUCOSE BLDC GLUCOMTR-MCNC: 109

## 2024-01-26 PROCEDURE — 82962 GLUCOSE BLOOD TEST: CPT

## 2024-01-26 PROCEDURE — G2211 COMPLEX E/M VISIT ADD ON: CPT

## 2024-01-26 PROCEDURE — 99214 OFFICE O/P EST MOD 30 MIN: CPT

## 2024-01-26 RX ORDER — GABAPENTIN 300 MG/1
300 CAPSULE ORAL
Refills: 0 | Status: ACTIVE | COMMUNITY

## 2024-01-26 RX ORDER — AMLODIPINE BESYLATE 5 MG/1
5 TABLET ORAL
Refills: 0 | Status: ACTIVE | COMMUNITY

## 2024-01-26 RX ORDER — METFORMIN HYDROCHLORIDE 500 MG/1
500 TABLET, COATED ORAL
Qty: 90 | Refills: 3 | Status: ACTIVE | COMMUNITY
Start: 2022-05-19 | End: 1900-01-01

## 2024-01-26 NOTE — ASSESSMENT
[FreeTextEntry1] : dm TYPE 2, STABLE CONTROL, TRANSIENT ELEVATION DUE TO EPIDURAL Hypothyroid, clinically euthyroid on replacement

## 2024-01-26 NOTE — HISTORY OF PRESENT ILLNESS
[FreeTextEntry1] : Quality:  Type 2.    Severity:  Moderate Duration:  15 years Associated Symptoms:    Modifying Factors:    Notes:  Current regimen: 	glipizide/metformin 5/500 4/day (prefers TEVA generic brand) plus additional metformin 500 mg, which seems to improve                 her control                 januvia 100  started on gabapentin, but developed rash and stopped this  c/o intermittent lightheadedness provoked by position changes. NO associated dyspnea or syncope. Symptoms improved with reduction in torsemide to one daily. Has follow up scheduled for lab testing.    Blood Glucose Testing Information   Patient is using the  meter and is testing 2 times per day.   Past Medical History Notes:  lymphocytosis, small cell lymphoma under treatment. Has developed a left supraclavicular node drug rash; since then off crestor. Resumed at 5 mg 1/2 every  day without adverse effect so far. hypothyroid  on synthroid manuel  thoracic shingles  s/p epidural injection

## 2024-02-12 ENCOUNTER — OFFICE (OUTPATIENT)
Dept: URBAN - METROPOLITAN AREA CLINIC 105 | Facility: CLINIC | Age: 89
Setting detail: OPHTHALMOLOGY
End: 2024-02-12
Payer: MEDICARE

## 2024-02-12 DIAGNOSIS — H35.373: ICD-10-CM

## 2024-02-12 DIAGNOSIS — H43.393: ICD-10-CM

## 2024-02-12 DIAGNOSIS — E11.3313: ICD-10-CM

## 2024-02-12 DIAGNOSIS — E11.3311: ICD-10-CM

## 2024-02-12 DIAGNOSIS — E11.3312: ICD-10-CM

## 2024-02-12 PROCEDURE — 92134 CPTRZ OPH DX IMG PST SGM RTA: CPT | Performed by: OPHTHALMOLOGY

## 2024-02-12 PROCEDURE — 99024 POSTOP FOLLOW-UP VISIT: CPT | Mod: 58 | Performed by: OPHTHALMOLOGY

## 2024-02-12 PROCEDURE — 67028 INJECTION EYE DRUG: CPT | Mod: 58,50 | Performed by: OPHTHALMOLOGY

## 2024-02-12 ASSESSMENT — REFRACTION_AUTOREFRACTION
OS_AXIS: 105
OD_SPHERE: -0.25
OD_AXIS: 002
OS_SPHERE: +0.50
OS_CYLINDER: -0.50
OD_CYLINDER: -1.00

## 2024-02-12 ASSESSMENT — CONFRONTATIONAL VISUAL FIELD TEST (CVF)
OD_FINDINGS: FULL
OS_FINDINGS: FULL

## 2024-02-12 ASSESSMENT — SPHEQUIV_DERIVED
OD_SPHEQUIV: -0.75
OS_SPHEQUIV: 0.25

## 2024-02-15 ENCOUNTER — OFFICE (OUTPATIENT)
Dept: URBAN - METROPOLITAN AREA CLINIC 105 | Facility: CLINIC | Age: 89
Setting detail: OPHTHALMOLOGY
End: 2024-02-15

## 2024-02-15 DIAGNOSIS — H90.3: ICD-10-CM

## 2024-02-15 PROCEDURE — 92593 HEARING AID CHECK; BINAURAL: CPT | Performed by: AUDIOLOGIST-HEARING AID FITTER

## 2024-03-13 ENCOUNTER — OFFICE (OUTPATIENT)
Dept: URBAN - METROPOLITAN AREA CLINIC 103 | Facility: CLINIC | Age: 89
Setting detail: OPHTHALMOLOGY
End: 2024-03-13
Payer: MEDICARE

## 2024-03-13 DIAGNOSIS — E11.3312: ICD-10-CM

## 2024-03-13 PROCEDURE — 67210 TREATMENT OF RETINAL LESION: CPT | Mod: LT | Performed by: OPHTHALMOLOGY

## 2024-03-26 ENCOUNTER — RX RENEWAL (OUTPATIENT)
Age: 89
End: 2024-03-26

## 2024-03-26 RX ORDER — GLIPIZIDE AND METFORMIN HYDROCHLORIDE 5; 500 MG/1; MG/1
5-500 TABLET, FILM COATED ORAL
Qty: 360 | Refills: 3 | Status: ACTIVE | COMMUNITY
Start: 2018-10-16 | End: 1900-01-01

## 2024-05-13 ENCOUNTER — OFFICE (OUTPATIENT)
Dept: URBAN - METROPOLITAN AREA CLINIC 105 | Facility: CLINIC | Age: 89
Setting detail: OPHTHALMOLOGY
End: 2024-05-13
Payer: MEDICARE

## 2024-05-13 DIAGNOSIS — E11.3311: ICD-10-CM

## 2024-05-13 DIAGNOSIS — H43.393: ICD-10-CM

## 2024-05-13 DIAGNOSIS — E11.3313: ICD-10-CM

## 2024-05-13 DIAGNOSIS — H35.373: ICD-10-CM

## 2024-05-13 PROCEDURE — 67210 TREATMENT OF RETINAL LESION: CPT | Mod: 79,RT | Performed by: OPHTHALMOLOGY

## 2024-05-13 PROCEDURE — 92134 CPTRZ OPH DX IMG PST SGM RTA: CPT | Performed by: OPHTHALMOLOGY

## 2024-05-13 ASSESSMENT — CONFRONTATIONAL VISUAL FIELD TEST (CVF)
OS_FINDINGS: FULL
OD_FINDINGS: FULL

## 2024-05-15 ENCOUNTER — RX RENEWAL (OUTPATIENT)
Age: 89
End: 2024-05-15

## 2024-05-15 ENCOUNTER — OFFICE (OUTPATIENT)
Dept: URBAN - METROPOLITAN AREA CLINIC 105 | Facility: CLINIC | Age: 89
Setting detail: OPHTHALMOLOGY
End: 2024-05-15

## 2024-05-15 DIAGNOSIS — H90.3: ICD-10-CM

## 2024-05-15 PROCEDURE — 92593 HEARING AID CHECK; BINAURAL: CPT | Performed by: AUDIOLOGIST-HEARING AID FITTER

## 2024-05-15 RX ORDER — LANCETS 33 GAUGE
EACH MISCELLANEOUS
Qty: 300 | Refills: 3 | Status: ACTIVE | COMMUNITY
Start: 2019-05-17 | End: 1900-01-01

## 2024-06-14 ENCOUNTER — OFFICE (OUTPATIENT)
Dept: URBAN - METROPOLITAN AREA CLINIC 105 | Facility: CLINIC | Age: 89
Setting detail: OPHTHALMOLOGY
End: 2024-06-14
Payer: MEDICARE

## 2024-06-14 DIAGNOSIS — E11.3312: ICD-10-CM

## 2024-06-14 PROCEDURE — 67210 TREATMENT OF RETINAL LESION: CPT | Mod: 79,LT | Performed by: OPHTHALMOLOGY

## 2024-06-14 ASSESSMENT — CONFRONTATIONAL VISUAL FIELD TEST (CVF)
OS_FINDINGS: FULL
OD_FINDINGS: FULL

## 2024-07-16 ENCOUNTER — OFFICE (OUTPATIENT)
Dept: URBAN - METROPOLITAN AREA CLINIC 94 | Facility: CLINIC | Age: 89
Setting detail: OPHTHALMOLOGY
End: 2024-07-16
Payer: MEDICARE

## 2024-07-16 DIAGNOSIS — E11.3313: ICD-10-CM

## 2024-07-16 DIAGNOSIS — H35.373: ICD-10-CM

## 2024-07-16 PROCEDURE — 92134 CPTRZ OPH DX IMG PST SGM RTA: CPT | Performed by: OPHTHALMOLOGY

## 2024-07-16 PROCEDURE — 67028 INJECTION EYE DRUG: CPT | Mod: 58,50 | Performed by: OPHTHALMOLOGY

## 2024-07-16 ASSESSMENT — CONFRONTATIONAL VISUAL FIELD TEST (CVF)
OD_FINDINGS: FULL
OS_FINDINGS: FULL

## 2024-08-26 ENCOUNTER — APPOINTMENT (OUTPATIENT)
Dept: ENDOCRINOLOGY | Facility: CLINIC | Age: 89
End: 2024-08-26
Payer: MEDICARE

## 2024-08-26 VITALS
BODY MASS INDEX: 33.57 KG/M2 | OXYGEN SATURATION: 95 % | HEIGHT: 60 IN | HEART RATE: 83 BPM | DIASTOLIC BLOOD PRESSURE: 72 MMHG | SYSTOLIC BLOOD PRESSURE: 120 MMHG | WEIGHT: 171 LBS

## 2024-08-26 DIAGNOSIS — E03.9 HYPOTHYROIDISM, UNSPECIFIED: ICD-10-CM

## 2024-08-26 DIAGNOSIS — E11.65 TYPE 2 DIABETES MELLITUS WITH HYPERGLYCEMIA: ICD-10-CM

## 2024-08-26 LAB — GLUCOSE BLDC GLUCOMTR-MCNC: 132

## 2024-08-26 PROCEDURE — G2211 COMPLEX E/M VISIT ADD ON: CPT

## 2024-08-26 PROCEDURE — 82962 GLUCOSE BLOOD TEST: CPT

## 2024-08-26 PROCEDURE — 99214 OFFICE O/P EST MOD 30 MIN: CPT

## 2024-08-26 NOTE — ASSESSMENT
[FreeTextEntry1] : dm type 2, acceptable control Hypothyroid, clinically euthyroid on replacement renew meds and update labs

## 2024-08-26 NOTE — HISTORY OF PRESENT ILLNESS
[FreeTextEntry1] : Quality:  Type 2.    Severity:  Moderate Duration:  15 years Associated Symptoms:    Modifying Factors:    Notes:  Current regimen: 	glipizide/metformin 5/500 4/day (prefers TEVA generic brand) plus additional metformin 500 mg, which seems to improve                 her control                 januvia 100  started on gabapentin, but developed rash and stopped this  c/o intermittent lightheadedness provoked by position changes. NO associated dyspnea or syncope. Symptoms improved with reduction in torsemide to one daily. Has follow up scheduled for lab testing.    Blood Glucose Testing Information   Patient is using the  meter and is testing 2 times per day.   Past Medical History Notes:  lymphocytosis, small cell lymphoma under treatment. Has developed a left supraclavicular node drug rash; since then off crestor. Resumed at 5 mg 1/2 every  day without adverse effect so far. hypothyroid  on synthroid manuel  thoracic shingles

## 2024-10-16 ENCOUNTER — OFFICE (OUTPATIENT)
Dept: URBAN - METROPOLITAN AREA CLINIC 105 | Facility: CLINIC | Age: 89
Setting detail: OPHTHALMOLOGY
End: 2024-10-16
Payer: MEDICARE

## 2024-10-16 DIAGNOSIS — E11.3313: ICD-10-CM

## 2024-10-16 DIAGNOSIS — H35.373: ICD-10-CM

## 2024-10-16 DIAGNOSIS — E11.3312: ICD-10-CM

## 2024-10-16 PROCEDURE — 92134 CPTRZ OPH DX IMG PST SGM RTA: CPT | Performed by: OPHTHALMOLOGY

## 2024-10-16 PROCEDURE — 67210 TREATMENT OF RETINAL LESION: CPT | Mod: LT | Performed by: OPHTHALMOLOGY

## 2024-10-16 ASSESSMENT — REFRACTION_AUTOREFRACTION
OS_AXIS: 105
OD_CYLINDER: -1.00
OD_AXIS: 002
OS_SPHERE: +0.50
OD_SPHERE: -0.25
OS_CYLINDER: -0.50

## 2024-10-16 ASSESSMENT — PACHYMETRY
OD_CT_CORRECTION: -1
OS_CT_UM: 560
OD_CT_UM: 560
OS_CT_CORRECTION: -1

## 2024-10-16 ASSESSMENT — KERATOMETRY
OS_AXISANGLE_DEGREES: 081
OD_K2POWER_DIOPTERS: 43.50
OS_K2POWER_DIOPTERS: 43.00
OS_K1POWER_DIOPTERS: 42.50
OD_AXISANGLE_DEGREES: 121
OD_K1POWER_DIOPTERS: 42.50

## 2024-10-16 ASSESSMENT — CONFRONTATIONAL VISUAL FIELD TEST (CVF)
OD_FINDINGS: FULL
OS_FINDINGS: FULL

## 2024-10-16 ASSESSMENT — VISUAL ACUITY
OS_BCVA: 20/20-
OD_BCVA: 20/30

## 2024-10-16 ASSESSMENT — TONOMETRY: OD_IOP_MMHG: 20

## 2024-12-12 ENCOUNTER — OFFICE (OUTPATIENT)
Dept: URBAN - METROPOLITAN AREA CLINIC 105 | Facility: CLINIC | Age: 89
Setting detail: OPHTHALMOLOGY
End: 2024-12-12
Payer: MEDICARE

## 2024-12-12 DIAGNOSIS — H01.002: ICD-10-CM

## 2024-12-12 DIAGNOSIS — H01.001: ICD-10-CM

## 2024-12-12 DIAGNOSIS — H01.005: ICD-10-CM

## 2024-12-12 DIAGNOSIS — H01.004: ICD-10-CM

## 2024-12-12 DIAGNOSIS — H10.523: ICD-10-CM

## 2024-12-12 PROCEDURE — 99213 OFFICE O/P EST LOW 20 MIN: CPT | Mod: 24 | Performed by: REGISTERED NURSE

## 2024-12-12 ASSESSMENT — VISUAL ACUITY
OD_BCVA: 20/40-2
OS_BCVA: 20/25

## 2024-12-12 ASSESSMENT — REFRACTION_AUTOREFRACTION
OS_SPHERE: +0.50
OS_AXIS: 105
OD_SPHERE: -0.25
OD_CYLINDER: -1.00
OS_CYLINDER: -0.50
OD_AXIS: 002

## 2024-12-12 ASSESSMENT — PACHYMETRY
OS_CT_CORRECTION: -1
OD_CT_UM: 560
OS_CT_UM: 560
OD_CT_CORRECTION: -1

## 2024-12-12 ASSESSMENT — KERATOMETRY
OD_AXISANGLE_DEGREES: 121
OS_K2POWER_DIOPTERS: 43.00
OS_K1POWER_DIOPTERS: 42.50
OD_K1POWER_DIOPTERS: 42.50
OD_K2POWER_DIOPTERS: 43.50
OS_AXISANGLE_DEGREES: 081

## 2024-12-12 ASSESSMENT — CONFRONTATIONAL VISUAL FIELD TEST (CVF)
OD_FINDINGS: FULL
OS_FINDINGS: FULL

## 2024-12-12 ASSESSMENT — LID EXAM ASSESSMENTS
OD_BLEPHARITIS: RLL RUL 2+
OS_BLEPHARITIS: LLL LUL 2+

## 2025-01-07 ENCOUNTER — OFFICE (OUTPATIENT)
Dept: URBAN - METROPOLITAN AREA CLINIC 105 | Facility: CLINIC | Age: OVER 89
Setting detail: OPHTHALMOLOGY
End: 2025-01-07
Payer: MEDICARE

## 2025-01-07 DIAGNOSIS — H90.3: ICD-10-CM

## 2025-01-07 PROCEDURE — 92557 COMPREHENSIVE HEARING TEST: CPT | Mod: AB | Performed by: AUDIOLOGIST-HEARING AID FITTER

## 2025-01-07 PROCEDURE — 92550 TYMPANOMETRY & REFLEX THRESH: CPT | Mod: AB | Performed by: AUDIOLOGIST-HEARING AID FITTER

## 2025-01-15 ENCOUNTER — OFFICE (OUTPATIENT)
Dept: URBAN - METROPOLITAN AREA CLINIC 105 | Facility: CLINIC | Age: OVER 89
Setting detail: OPHTHALMOLOGY
End: 2025-01-15
Payer: MEDICARE

## 2025-01-15 DIAGNOSIS — E11.3313: ICD-10-CM

## 2025-01-15 DIAGNOSIS — H35.373: ICD-10-CM

## 2025-01-15 PROCEDURE — 92134 CPTRZ OPH DX IMG PST SGM RTA: CPT | Performed by: OPHTHALMOLOGY

## 2025-01-15 PROCEDURE — 67028 INJECTION EYE DRUG: CPT | Mod: 50 | Performed by: OPHTHALMOLOGY

## 2025-01-15 ASSESSMENT — VISUAL ACUITY
OS_BCVA: 20/30
OD_BCVA: 20/40

## 2025-01-15 ASSESSMENT — REFRACTION_AUTOREFRACTION
OS_SPHERE: +0.50
OD_AXIS: 002
OD_CYLINDER: -1.00
OS_CYLINDER: -0.50
OS_AXIS: 105
OD_SPHERE: -0.25

## 2025-01-15 ASSESSMENT — PACHYMETRY
OS_CT_UM: 560
OS_CT_CORRECTION: -1
OD_CT_UM: 560
OD_CT_CORRECTION: -1

## 2025-01-15 ASSESSMENT — CONFRONTATIONAL VISUAL FIELD TEST (CVF)
OD_FINDINGS: FULL
OS_FINDINGS: FULL

## 2025-01-15 ASSESSMENT — KERATOMETRY
OD_K1POWER_DIOPTERS: 42.50
OS_AXISANGLE_DEGREES: 081
OD_K2POWER_DIOPTERS: 43.50
OD_AXISANGLE_DEGREES: 121
OS_K2POWER_DIOPTERS: 43.00
OS_K1POWER_DIOPTERS: 42.50

## 2025-01-15 ASSESSMENT — TONOMETRY
OS_IOP_MMHG: 19
OD_IOP_MMHG: 18

## 2025-01-15 ASSESSMENT — LID EXAM ASSESSMENTS
OD_BLEPHARITIS: RLL RUL 2+
OS_BLEPHARITIS: LLL LUL 2+

## 2025-01-23 ENCOUNTER — RX RENEWAL (OUTPATIENT)
Age: 89
End: 2025-01-23

## 2025-01-30 ENCOUNTER — RX RENEWAL (OUTPATIENT)
Age: 89
End: 2025-01-30

## 2025-02-11 ENCOUNTER — NON-APPOINTMENT (OUTPATIENT)
Age: 89
End: 2025-02-11

## 2025-04-07 LAB
HBA1C MFR BLD HPLC: 7.4
LDLC SERPL DIRECT ASSAY-MCNC: 54
TSH SERPL-ACNC: 6.69

## 2025-04-08 ENCOUNTER — APPOINTMENT (OUTPATIENT)
Dept: ENDOCRINOLOGY | Facility: CLINIC | Age: 89
End: 2025-04-08

## 2025-04-08 ENCOUNTER — OFFICE (OUTPATIENT)
Dept: URBAN - METROPOLITAN AREA CLINIC 105 | Facility: CLINIC | Age: OVER 89
Setting detail: OPHTHALMOLOGY
End: 2025-04-08

## 2025-04-08 ENCOUNTER — APPOINTMENT (OUTPATIENT)
Dept: ENDOCRINOLOGY | Facility: CLINIC | Age: 89
End: 2025-04-08
Payer: MEDICARE

## 2025-04-08 ENCOUNTER — RESULT CHARGE (OUTPATIENT)
Age: 89
End: 2025-04-08

## 2025-04-08 VITALS
BODY MASS INDEX: 34.75 KG/M2 | HEART RATE: 83 BPM | HEIGHT: 60 IN | DIASTOLIC BLOOD PRESSURE: 60 MMHG | WEIGHT: 177 LBS | SYSTOLIC BLOOD PRESSURE: 130 MMHG | OXYGEN SATURATION: 97 %

## 2025-04-08 DIAGNOSIS — H90.3: ICD-10-CM

## 2025-04-08 DIAGNOSIS — E11.9 TYPE 2 DIABETES MELLITUS W/OUT COMPLICATIONS: ICD-10-CM

## 2025-04-08 DIAGNOSIS — E03.9 HYPOTHYROIDISM, UNSPECIFIED: ICD-10-CM

## 2025-04-08 LAB — GLUCOSE BLDC GLUCOMTR-MCNC: 252

## 2025-04-08 PROCEDURE — 92593 HEARING AID CHECK; BINAURAL: CPT | Performed by: AUDIOLOGIST-HEARING AID FITTER

## 2025-04-08 PROCEDURE — 82962 GLUCOSE BLOOD TEST: CPT

## 2025-04-08 PROCEDURE — 99214 OFFICE O/P EST MOD 30 MIN: CPT

## 2025-04-08 PROCEDURE — G2211 COMPLEX E/M VISIT ADD ON: CPT

## 2025-04-19 ENCOUNTER — RX RENEWAL (OUTPATIENT)
Age: 89
End: 2025-04-19

## 2025-04-28 ENCOUNTER — OFFICE (OUTPATIENT)
Dept: URBAN - METROPOLITAN AREA CLINIC 105 | Facility: CLINIC | Age: OVER 89
Setting detail: OPHTHALMOLOGY
End: 2025-04-28
Payer: MEDICARE

## 2025-04-28 DIAGNOSIS — E11.3311: ICD-10-CM

## 2025-04-28 DIAGNOSIS — H35.373: ICD-10-CM

## 2025-04-28 DIAGNOSIS — E11.3313: ICD-10-CM

## 2025-04-28 PROCEDURE — 67210 TREATMENT OF RETINAL LESION: CPT | Mod: RT | Performed by: OPHTHALMOLOGY

## 2025-04-28 PROCEDURE — 92134 CPTRZ OPH DX IMG PST SGM RTA: CPT | Performed by: OPHTHALMOLOGY

## 2025-04-28 ASSESSMENT — KERATOMETRY
OD_AXISANGLE_DEGREES: 121
OS_AXISANGLE_DEGREES: 081
OD_K1POWER_DIOPTERS: 42.50
OS_K1POWER_DIOPTERS: 42.50
OD_K2POWER_DIOPTERS: 43.50
OS_K2POWER_DIOPTERS: 43.00

## 2025-04-28 ASSESSMENT — REFRACTION_AUTOREFRACTION
OS_SPHERE: +0.50
OD_SPHERE: -0.25
OD_AXIS: 002
OS_CYLINDER: -0.50
OD_CYLINDER: -1.00
OS_AXIS: 105

## 2025-04-28 ASSESSMENT — LID EXAM ASSESSMENTS
OS_BLEPHARITIS: LLL LUL 2+
OD_BLEPHARITIS: RLL RUL 2+

## 2025-04-28 ASSESSMENT — VISUAL ACUITY
OD_BCVA: 20/30-
OS_BCVA: 20/20-

## 2025-04-28 ASSESSMENT — TONOMETRY: OD_IOP_MMHG: 20

## 2025-04-28 ASSESSMENT — PACHYMETRY
OS_CT_UM: 560
OD_CT_UM: 560
OD_CT_CORRECTION: -1
OS_CT_CORRECTION: -1

## 2025-05-01 ENCOUNTER — APPOINTMENT (OUTPATIENT)
Dept: OPHTHALMOLOGY | Facility: CLINIC | Age: 89
End: 2025-05-01

## 2025-06-23 ENCOUNTER — OFFICE (OUTPATIENT)
Dept: URBAN - METROPOLITAN AREA CLINIC 105 | Facility: CLINIC | Age: OVER 89
Setting detail: OPHTHALMOLOGY
End: 2025-06-23
Payer: MEDICARE

## 2025-06-23 DIAGNOSIS — E11.3312: ICD-10-CM

## 2025-06-23 PROCEDURE — 67210 TREATMENT OF RETINAL LESION: CPT | Mod: 79,LT | Performed by: OPHTHALMOLOGY

## 2025-06-23 ASSESSMENT — PACHYMETRY
OS_CT_CORRECTION: -1
OD_CT_UM: 560
OS_CT_UM: 560
OD_CT_CORRECTION: -1

## 2025-06-23 ASSESSMENT — REFRACTION_AUTOREFRACTION
OD_AXIS: 002
OS_CYLINDER: -0.50
OS_SPHERE: +0.50
OD_CYLINDER: -1.00
OD_SPHERE: -0.25
OS_AXIS: 105

## 2025-06-23 ASSESSMENT — KERATOMETRY
OS_AXISANGLE_DEGREES: 081
OS_K2POWER_DIOPTERS: 43.00
OD_AXISANGLE_DEGREES: 121
OD_K2POWER_DIOPTERS: 43.50
OS_K1POWER_DIOPTERS: 42.50
OD_K1POWER_DIOPTERS: 42.50

## 2025-06-23 ASSESSMENT — LID EXAM ASSESSMENTS
OD_BLEPHARITIS: RLL RUL 2+
OS_BLEPHARITIS: LLL LUL 2+

## 2025-06-23 ASSESSMENT — VISUAL ACUITY
OD_BCVA: 20/30-
OS_BCVA: 20/25-2

## 2025-06-23 ASSESSMENT — CONFRONTATIONAL VISUAL FIELD TEST (CVF)
OS_FINDINGS: FULL
OD_FINDINGS: FULL

## 2025-07-19 ENCOUNTER — RX RENEWAL (OUTPATIENT)
Age: 89
End: 2025-07-19

## 2025-07-24 ENCOUNTER — OFFICE (OUTPATIENT)
Dept: URBAN - METROPOLITAN AREA CLINIC 105 | Facility: CLINIC | Age: OVER 89
Setting detail: OPHTHALMOLOGY
End: 2025-07-24

## 2025-07-24 DIAGNOSIS — H90.3: ICD-10-CM

## 2025-07-24 PROCEDURE — 92593 HEARING AID CHECK; BINAURAL: CPT | Performed by: AUDIOLOGIST-HEARING AID FITTER

## 2025-08-20 ENCOUNTER — OFFICE (OUTPATIENT)
Dept: URBAN - METROPOLITAN AREA CLINIC 105 | Facility: CLINIC | Age: OVER 89
Setting detail: OPHTHALMOLOGY
End: 2025-08-20
Payer: MEDICARE

## 2025-08-20 DIAGNOSIS — E11.3313: ICD-10-CM

## 2025-08-20 DIAGNOSIS — H35.373: ICD-10-CM

## 2025-08-20 PROBLEM — E11.3312 DM TYPE 2; RIGHT MOD WITH ME, LEFT MOD WITH ME: Status: ACTIVE | Noted: 2025-08-20

## 2025-08-20 PROBLEM — E11.3311 DM TYPE 2; RIGHT MOD WITH ME, LEFT MOD WITH ME: Status: ACTIVE | Noted: 2025-08-20

## 2025-08-20 PROCEDURE — 67028 INJECTION EYE DRUG: CPT | Mod: 58,50 | Performed by: OPHTHALMOLOGY

## 2025-08-20 PROCEDURE — 92134 CPTRZ OPH DX IMG PST SGM RTA: CPT | Performed by: OPHTHALMOLOGY

## 2025-08-20 ASSESSMENT — REFRACTION_AUTOREFRACTION
OS_SPHERE: +0.50
OS_AXIS: 105
OD_CYLINDER: -1.00
OD_SPHERE: -0.25
OD_AXIS: 002
OS_CYLINDER: -0.50

## 2025-08-20 ASSESSMENT — CONFRONTATIONAL VISUAL FIELD TEST (CVF)
OD_FINDINGS: FULL
OS_FINDINGS: FULL

## 2025-08-20 ASSESSMENT — PACHYMETRY
OD_CT_UM: 560
OS_CT_UM: 560
OS_CT_CORRECTION: -1
OD_CT_CORRECTION: -1

## 2025-08-20 ASSESSMENT — VISUAL ACUITY
OD_BCVA: 20/30+2
OS_BCVA: 20/25+

## 2025-08-20 ASSESSMENT — TONOMETRY
OD_IOP_MMHG: 20
OS_IOP_MMHG: 21

## 2025-08-20 ASSESSMENT — KERATOMETRY
OD_AXISANGLE_DEGREES: 121
OS_AXISANGLE_DEGREES: 081
OS_K2POWER_DIOPTERS: 43.00
OD_K2POWER_DIOPTERS: 43.50
OS_K1POWER_DIOPTERS: 42.50
OD_K1POWER_DIOPTERS: 42.50

## 2025-08-20 ASSESSMENT — LID EXAM ASSESSMENTS
OD_BLEPHARITIS: RLL RUL 2+
OS_BLEPHARITIS: LLL LUL 2+